# Patient Record
Sex: FEMALE | Race: WHITE | Employment: OTHER | URBAN - METROPOLITAN AREA
[De-identification: names, ages, dates, MRNs, and addresses within clinical notes are randomized per-mention and may not be internally consistent; named-entity substitution may affect disease eponyms.]

---

## 2023-10-14 ENCOUNTER — HOSPITAL ENCOUNTER (EMERGENCY)
Age: 88
Discharge: HOME | End: 2023-10-14
Payer: MEDICARE

## 2023-10-14 VITALS
RESPIRATION RATE: 17 BRPM | HEIGHT: 60 IN | WEIGHT: 135 LBS | DIASTOLIC BLOOD PRESSURE: 82 MMHG | HEART RATE: 75 BPM | SYSTOLIC BLOOD PRESSURE: 149 MMHG | OXYGEN SATURATION: 98 % | BODY MASS INDEX: 26.5 KG/M2 | TEMPERATURE: 98.4 F

## 2023-10-14 DIAGNOSIS — R51.9 CHRONIC FACIAL PAIN: Primary | ICD-10-CM

## 2023-10-14 DIAGNOSIS — G89.29 CHRONIC FACIAL PAIN: Primary | ICD-10-CM

## 2023-10-14 PROBLEM — D63.1 ANEMIA IN CHRONIC KIDNEY DISEASE: Status: ACTIVE | Noted: 2023-10-14

## 2023-10-14 PROBLEM — J11.00 INFLUENZAL PNEUMONIA: Status: ACTIVE | Noted: 2022-11-04

## 2023-10-14 PROBLEM — J45.901 ASTHMATIC BRONCHITIS WITH ACUTE EXACERBATION: Status: ACTIVE | Noted: 2018-02-26

## 2023-10-14 PROBLEM — M85.89 OSTEOPENIA OF MULTIPLE SITES: Status: ACTIVE | Noted: 2021-08-05

## 2023-10-14 PROBLEM — F03.90 DEMENTIA (HCC): Status: ACTIVE | Noted: 2019-08-27

## 2023-10-14 PROBLEM — I25.10 ATHEROSCLEROTIC HEART DISEASE OF NATIVE CORONARY ARTERY WITHOUT ANGINA PECTORIS: Status: ACTIVE | Noted: 2022-05-13

## 2023-10-14 PROBLEM — N18.9 ANEMIA IN CHRONIC KIDNEY DISEASE: Status: ACTIVE | Noted: 2023-10-14

## 2023-10-14 PROBLEM — E07.9 DISEASE OF THYROID GLAND: Status: ACTIVE | Noted: 2023-10-14

## 2023-10-14 PROBLEM — C50.919 MALIGNANT NEOPLASM OF FEMALE BREAST (HCC): Status: ACTIVE | Noted: 2023-10-14

## 2023-10-14 PROBLEM — I44.7 LEFT BUNDLE BRANCH BLOCK (LBBB): Status: ACTIVE | Noted: 2019-08-27

## 2023-10-14 PROBLEM — N18.4 CKD (CHRONIC KIDNEY DISEASE), STAGE IV (HCC): Status: ACTIVE | Noted: 2022-02-09

## 2023-10-14 PROBLEM — J45.901 ASTHMA EXACERBATION: Status: ACTIVE | Noted: 2022-06-04

## 2023-10-14 PROBLEM — I50.33 ACUTE ON CHRONIC DIASTOLIC HEART FAILURE (HCC): Status: ACTIVE | Noted: 2018-02-26

## 2023-10-14 PROBLEM — J45.40 ASTHMA, MODERATE PERSISTENT: Status: ACTIVE | Noted: 2023-10-14

## 2023-10-14 PROBLEM — R60.9 EDEMA: Status: ACTIVE | Noted: 2023-10-14

## 2023-10-14 PROCEDURE — 99282 EMERGENCY DEPT VISIT SF MDM: CPT

## 2023-10-14 RX ORDER — TETRACAINE HYDROCHLORIDE 5 MG/ML
1 SOLUTION OPHTHALMIC
Status: DISCONTINUED | OUTPATIENT
Start: 2023-10-14 | End: 2023-10-14

## 2023-10-14 ASSESSMENT — PAIN SCALES - GENERAL: PAINLEVEL_OUTOF10: 8

## 2023-10-14 ASSESSMENT — LIFESTYLE VARIABLES
HOW MANY STANDARD DRINKS CONTAINING ALCOHOL DO YOU HAVE ON A TYPICAL DAY: PATIENT DOES NOT DRINK
HOW OFTEN DO YOU HAVE A DRINK CONTAINING ALCOHOL: NEVER

## 2023-10-14 ASSESSMENT — PAIN DESCRIPTION - LOCATION: LOCATION: FACE

## 2023-10-14 NOTE — ED PROVIDER NOTES
Emergency Department Provider Note       PCP: Unknown, Provider   Age: 80 y.o. Sex: female     DISPOSITION Decision To Discharge 10/14/2023 04:42:45 PM       ICD-10-CM    1. Chronic facial pain  R51.9     G89.29           Medical Decision Making     Complexity of Problems Addressed:  1 uncomplicated chronic condition    Data Reviewed and Analyzed:  I independently ordered and reviewed each unique test.  I reviewed external records: provider visit note from PCP. Primary care visit from 9/8/2023 for cellulitis  I reviewed notes from outside ER visit on 4/27/2023 for postherpetic trigeminal neuralgia in which patient was prescribed gabapentin          Discussion of management or test interpretation. Vital signs reviewed, patient stable, NAD, afebrile, nontoxic in appearance     Heart rate 76  O2 saturation 97% on room air      in summary this is a 68-year-old female who presents emergency department today accompanied by her granddaughter with chief complaint of left-sided facial pain for approximately 1 year after having had shingles on the left side of her face. Patient denies any rashes or acute worsening of the pain. Patient's granddaughter states that patient does have an appointment with pain management in a couple of weeks. States that her grandmother wanted to come to the emergency department to see if there is anything that we can do for her. Physical exam is reassuring. No lesions to the left side of patient's face. No lesions to her nose, no lesions to her ear or in her hairline. I discussed with patient that we can try some gabapentin. Patient's granddaughter states that she has tried it in the past and she did not tolerate it it made her dizzy and lightheaded. I do not believe that the benefit outweighs the risk as this patient is 80years old and a fall at this age can be debilitating if not life-threatening if there is a head strike. Granddaughter and patient agree.      review of Mood and Affect: Mood normal.         Behavior: Behavior normal.         Thought Content: Thought content normal.         Judgment: Judgment normal.          Procedures     Procedures    No orders of the defined types were placed in this encounter. Medications given during this emergency department visit:  Medications - No data to display    New Prescriptions    No medications on file        History reviewed. No pertinent past medical history. History reviewed. No pertinent surgical history. Social History     Socioeconomic History    Marital status:      Spouse name: None    Number of children: None    Years of education: None    Highest education level: None        Previous Medications    No medications on file        No results found for any visits on 10/14/23. No orders to display                     Voice dictation software was used during the making of this note. This software is not perfect and grammatical and other typographical errors may be present. This note has not been completely proofread for errors.       Nely Rogers, [address] [place] [address]  10/14/23 0624

## 2023-10-14 NOTE — DISCHARGE INSTRUCTIONS
You were evaluated in the emergency department today for chronic left-sided facial pain after you had the shingles 1 year ago. Your physical exam is reassuring. Options were gabapentin which she did not tolerate in the past.  I do not think the benefit outweighs the risk associated with the side effects that you experienced. I do think you need to contact pain management on Monday to let them know you were seen here in the emergency department and see if they can move your appointment to an earlier date.     Return to the emergency department for any chest pain, shortness of breath, signs and symptoms of stroke, development of a rash on the left side of your face or any aspect of your face